# Patient Record
(demographics unavailable — no encounter records)

---

## 2024-11-12 NOTE — HISTORY OF PRESENT ILLNESS
[FreeTextEntry1] : 68 M AFIB PE on Xarelto developed rash on eliquis Aortic root dilation hyperlipidemia CAD    seen here for follow up of fatigue and feeling out of sorts. hotler done with afib and pauses up to 3 seconds during sleep. he feels better today afib burden 4%, back to himself    ecg nsr Diffuse T wave inversions  11/12/24 cath non obs 2/2019  Echo normal EF  mitral valve leaflets thick mild MR 5/10/24

## 2024-11-12 NOTE — PHYSICAL EXAM
[Well Developed] : well developed [Well Nourished] : well nourished [No Acute Distress] : no acute distress [Normal Venous Pressure] : normal venous pressure [No Carotid Bruit] : no carotid bruit [Normal S1, S2] : normal S1, S2 [No Murmur] : no murmur [No Rub] : no rub [No Gallop] : no gallop [Clear Lung Fields] : clear lung fields [Good Air Entry] : good air entry [No Respiratory Distress] : no respiratory distress  [Soft] : abdomen soft [Non Tender] : non-tender [No Masses/organomegaly] : no masses/organomegaly [Normal Bowel Sounds] : normal bowel sounds [Normal Gait] : normal gait [No Edema] : no edema [No Cyanosis] : no cyanosis [No Clubbing] : no clubbing [No Varicosities] : no varicosities [No Rash] : no rash [No Skin Lesions] : no skin lesions [Moves all extremities] : moves all extremities [No Focal Deficits] : no focal deficits [Normal Speech] : normal speech [Alert and Oriented] : alert and oriented [Normal memory] : normal memory [General Appearance - Well Developed] : well developed [Normal Appearance] : normal appearance [Well Groomed] : well groomed [General Appearance - Well Nourished] : well nourished [No Deformities] : no deformities [General Appearance - In No Acute Distress] : no acute distress [Normal Conjunctiva] : the conjunctiva exhibited no abnormalities [Eyelids - No Xanthelasma] : the eyelids demonstrated no xanthelasmas [Normal Oral Mucosa] : normal oral mucosa [No Oral Pallor] : no oral pallor [No Oral Cyanosis] : no oral cyanosis [Normal Jugular Venous A Waves Present] : normal jugular venous A waves present [Normal Jugular Venous V Waves Present] : normal jugular venous V waves present [No Jugular Venous Chase A Waves] : no jugular venous chase A waves [Respiration, Rhythm And Depth] : normal respiratory rhythm and effort [Exaggerated Use Of Accessory Muscles For Inspiration] : no accessory muscle use [Auscultation Breath Sounds / Voice Sounds] : lungs were clear to auscultation bilaterally [Heart Rate And Rhythm] : heart rate and rhythm were normal [Heart Sounds] : normal S1 and S2 [Murmurs] : no murmurs present [Abdomen Soft] : soft [Abdomen Tenderness] : non-tender [Abdomen Mass (___ Cm)] : no abdominal mass palpated [Abnormal Walk] : normal gait [Gait - Sufficient For Exercise Testing] : the gait was sufficient for exercise testing [Nail Clubbing] : no clubbing of the fingernails [Cyanosis, Localized] : no localized cyanosis [Petechial Hemorrhages (___cm)] : no petechial hemorrhages [Skin Color & Pigmentation] : normal skin color and pigmentation [] : no rash [No Venous Stasis] : no venous stasis [Skin Lesions] : no skin lesions [No Skin Ulcers] : no skin ulcer [No Xanthoma] : no  xanthoma was observed [Oriented To Time, Place, And Person] : oriented to person, place, and time [Affect] : the affect was normal [Mood] : the mood was normal [No Anxiety] : not feeling anxious

## 2024-11-12 NOTE — PROCEDURE
[Normal] : 3. No pericardial effusion. [de-identified] : Dr. Kenneth Ch (card) [de-identified] : Nicole Petri-Schoener, LUISCS [de-identified] : abnormal EKG [de-identified] : 0.9 [de-identified] : 1.0 [de-identified] : 4.7 [de-identified] : 2.6 [de-identified] : 4.0 [de-identified] : 3.5 [de-identified] : 24 mmHg [de-identified] : 60-97 [de-identified] : There is grade I diastolic dysfunction. [de-identified] : The left atrium is normal in size. The left atrial volume index is 29 ml/m2. [de-identified] : There is trace pulmonic insufficiency. [de-identified] : There is minimal tricuspid regurgitation. [de-identified] : The inferior vena cava measures 1.0 cm. [de-identified] : Mild mitral regurgitation [de-identified] : Dilated aortic root 4.0 cm  [FreeTextEntry1] : : 1956\par  Age: 64 y\par  Sex: M\par  BP: 106/68\par  Height: 178 cm\par  Weight: 79 kg\par  BSA: 2.0 m2\par

## 2024-11-12 NOTE — ASSESSMENT
[FreeTextEntry1] : afib chadsvasc 2 on xarelto (rash with eliquis)  hyperlipidemia last ldl at pcp was 91 does not want to increase his atorvastatin 20 mg daily afib on xarelto 20 mg daily and toprol xl 25 mg daily  abnormal ECG MRI claustrophobic never done he likely has sleep apnea but cannot tolerate testing or face mask will try at home fu in six months